# Patient Record
Sex: FEMALE | Race: OTHER | HISPANIC OR LATINO
[De-identification: names, ages, dates, MRNs, and addresses within clinical notes are randomized per-mention and may not be internally consistent; named-entity substitution may affect disease eponyms.]

---

## 2020-10-01 ENCOUNTER — OFFICE VISIT (OUTPATIENT)
Age: 60
End: 2020-10-01

## 2021-08-01 ENCOUNTER — OFFICE VISIT (OUTPATIENT)
Age: 61
End: 2021-08-01

## 2021-09-10 ENCOUNTER — OFFICE VISIT (OUTPATIENT)
Dept: URBAN - METROPOLITAN AREA CLINIC 7 | Facility: CLINIC | Age: 61
End: 2021-09-10

## 2021-09-17 ENCOUNTER — OFFICE VISIT (OUTPATIENT)
Dept: URBAN - METROPOLITAN AREA SURGERY CENTER 5 | Facility: SURGERY CENTER | Age: 61
End: 2021-09-17

## 2022-07-07 ENCOUNTER — WEB ENCOUNTER (OUTPATIENT)
Dept: URBAN - METROPOLITAN AREA CLINIC 63 | Facility: CLINIC | Age: 62
End: 2022-07-07

## 2022-07-09 ENCOUNTER — TELEPHONE ENCOUNTER (OUTPATIENT)
Dept: URBAN - METROPOLITAN AREA CLINIC 121 | Facility: CLINIC | Age: 62
End: 2022-07-09

## 2022-07-10 ENCOUNTER — TELEPHONE ENCOUNTER (OUTPATIENT)
Dept: URBAN - METROPOLITAN AREA CLINIC 121 | Facility: CLINIC | Age: 62
End: 2022-07-10

## 2022-07-11 ENCOUNTER — OFFICE VISIT (OUTPATIENT)
Dept: URBAN - METROPOLITAN AREA CLINIC 63 | Facility: CLINIC | Age: 62
End: 2022-07-11
Payer: COMMERCIAL

## 2022-07-11 VITALS
WEIGHT: 146.2 LBS | OXYGEN SATURATION: 96 % | TEMPERATURE: 97.9 F | HEIGHT: 59 IN | SYSTOLIC BLOOD PRESSURE: 146 MMHG | DIASTOLIC BLOOD PRESSURE: 96 MMHG | HEART RATE: 91 BPM | BODY MASS INDEX: 29.48 KG/M2

## 2022-07-11 DIAGNOSIS — K29.60 REFLUX GASTRITIS: ICD-10-CM

## 2022-07-11 DIAGNOSIS — K21.00 GASTROESOPHAGEAL REFLUX DISEASE WITH ESOPHAGITIS WITHOUT HEMORRHAGE: ICD-10-CM

## 2022-07-11 DIAGNOSIS — K29.30 CHRONIC SUPERFICIAL GASTRITIS WITHOUT BLEEDING: ICD-10-CM

## 2022-07-11 PROBLEM — 266433003: Status: ACTIVE | Noted: 2022-07-11

## 2022-07-11 PROBLEM — 196735001: Status: ACTIVE | Noted: 2022-07-11

## 2022-07-11 PROCEDURE — 99204 OFFICE O/P NEW MOD 45 MIN: CPT | Performed by: INTERNAL MEDICINE

## 2022-07-11 RX ORDER — RABEPRAZOLE SODIUM 20 MG/1
1 TABLET TABLET, DELAYED RELEASE ORAL ONCE A DAY
Status: ACTIVE | COMMUNITY

## 2022-07-11 RX ORDER — PRAVASTATIN SODIUM 10 MG/1
1 TABLET TABLET ORAL ONCE A DAY
Status: ACTIVE | COMMUNITY

## 2022-07-11 RX ORDER — CILOSTAZOL 50 MG/1
1 TABLET 30 MINUTES BEFORE OR 2 HOURS AFTER BREAKFAST AND DINNER TABLET ORAL TWICE A DAY
Status: ACTIVE | COMMUNITY

## 2022-07-11 RX ORDER — LEVOTHYROXINE SODIUM 50 UG/1
1 TABLET IN THE MORNING ON AN EMPTY STOMACH TABLET ORAL ONCE A DAY
Status: ACTIVE | COMMUNITY

## 2022-07-11 RX ORDER — FAMOTIDINE 20 MG/1
1 TABLET AT BEDTIME AS NEEDED TABLET, FILM COATED ORAL ONCE A DAY
Status: ACTIVE | COMMUNITY

## 2022-07-11 RX ORDER — TELMISARTAN AND HYDROCHLOROTHIAZIDE 12.5; 8 MG/1; MG/1
1 TABLET TABLET ORAL ONCE A DAY
Status: ACTIVE | COMMUNITY

## 2022-07-11 NOTE — HPI-TODAY'S VISIT:
Patient had an upper endoscopy and colonoscopy in September 2021 with impression of a small hiatal hernia, distal esophageal biopsies were taken, normal appearance of esophagus.  Single fundic gland gastric polyp, biopsies were taken, gastritis, large duodenal diverticulum in the second portion of the duodenum, biopsies were taken of the small bowel gastric, gastric polyp, and distal esophagus, at this moment I do have the pathology will need to follow-up this with PCP.  Patient also had a colonoscopy that day that only show a small hemorrhoids she will have a repeat colonoscopy in 10 years, denies family history of colorectal cancer or alarm symptoms.  Patient had abdominal pain in the right upper quadrant and ultrasound was performed and has per patient patient have a cyst that seems to have been increased in size, will need records of ultrasound and the pathology of the previous test test before to adjust treatment to precise plan will follow protocol. Marya is taking PPI, but she stop treatment symptoms recur.  Will need records, will follow in 4 weeks.

## 2022-07-30 ENCOUNTER — TELEPHONE ENCOUNTER (OUTPATIENT)
Age: 62
End: 2022-07-30

## 2022-07-31 ENCOUNTER — TELEPHONE ENCOUNTER (OUTPATIENT)
Age: 62
End: 2022-07-31

## 2022-07-31 RX ORDER — RABEPRAZOLE SODIUM 20 MG/1
1 TABLET, DELAYED RELEASE ORAL
Qty: 0 | Refills: 16 | Status: ACTIVE | COMMUNITY
Start: 2021-09-17

## 2022-08-13 ENCOUNTER — WEB ENCOUNTER (OUTPATIENT)
Dept: URBAN - METROPOLITAN AREA CLINIC 63 | Facility: CLINIC | Age: 62
End: 2022-08-13

## 2022-08-17 ENCOUNTER — OFFICE VISIT (OUTPATIENT)
Dept: URBAN - METROPOLITAN AREA CLINIC 63 | Facility: CLINIC | Age: 62
End: 2022-08-17
Payer: COMMERCIAL

## 2022-08-17 ENCOUNTER — WEB ENCOUNTER (OUTPATIENT)
Dept: URBAN - METROPOLITAN AREA CLINIC 63 | Facility: CLINIC | Age: 62
End: 2022-08-17

## 2022-08-17 VITALS
WEIGHT: 145 LBS | HEIGHT: 59 IN | DIASTOLIC BLOOD PRESSURE: 74 MMHG | RESPIRATION RATE: 20 BRPM | TEMPERATURE: 98 F | SYSTOLIC BLOOD PRESSURE: 126 MMHG | HEART RATE: 69 BPM | OXYGEN SATURATION: 98 % | BODY MASS INDEX: 29.23 KG/M2

## 2022-08-17 DIAGNOSIS — R10.13 DYSPEPSIA: ICD-10-CM

## 2022-08-17 DIAGNOSIS — K21.9 GERD (GASTROESOPHAGEAL REFLUX DISEASE): ICD-10-CM

## 2022-08-17 PROCEDURE — 99213 OFFICE O/P EST LOW 20 MIN: CPT | Performed by: INTERNAL MEDICINE

## 2022-08-17 RX ORDER — CILOSTAZOL 50 MG/1
1 TABLET 30 MINUTES BEFORE OR 2 HOURS AFTER BREAKFAST AND DINNER TABLET ORAL TWICE A DAY
Status: ACTIVE | COMMUNITY

## 2022-08-17 RX ORDER — LEVOTHYROXINE SODIUM 50 UG/1
1 TABLET IN THE MORNING ON AN EMPTY STOMACH TABLET ORAL ONCE A DAY
Status: ACTIVE | COMMUNITY

## 2022-08-17 RX ORDER — FAMOTIDINE 20 MG/1
1 TABLET AT BEDTIME AS NEEDED TABLET, FILM COATED ORAL ONCE A DAY
Status: ACTIVE | COMMUNITY

## 2022-08-17 RX ORDER — RABEPRAZOLE SODIUM 20 MG/1
1 TABLET TABLET, DELAYED RELEASE ORAL ONCE A DAY
Status: ACTIVE | COMMUNITY

## 2022-08-17 RX ORDER — PREGABALIN 50 MG/1
1 CAPSULE CAPSULE ORAL ONCE A DAY
Status: ACTIVE | COMMUNITY

## 2022-08-17 RX ORDER — CYCLOBENZAPRINE HYDROCHLORIDE 10 MG/1
1 TABLET AT BEDTIME AS NEEDED TABLET, FILM COATED ORAL ONCE A DAY
Status: ACTIVE | COMMUNITY

## 2022-08-17 RX ORDER — PRAVASTATIN SODIUM 10 MG/1
1 TABLET TABLET ORAL ONCE A DAY
Status: ACTIVE | COMMUNITY

## 2022-08-17 RX ORDER — TELMISARTAN AND HYDROCHLOROTHIAZIDE 12.5; 8 MG/1; MG/1
1 TABLET TABLET ORAL ONCE A DAY
Status: ACTIVE | COMMUNITY

## 2022-08-17 NOTE — HPI-TODAY'S VISIT:
Patient had an upper endoscopy and colonoscopy in September 2021 with impression of a small hiatal hernia, distal esophageal biopsies were taken, normal appearance of esophagus.  Single fundic gland gastric polyp, biopsies were taken, gastritis, large duodenal diverticulum in the second portion of the duodenum, biopsies were taken of the small bowel gastric, gastric polyp, and distal esophagus, at this moment I do have the pathology will need to follow-up this with PCP.  Patient also had a colonoscopy that day that only show a small hemorrhoids she will have a repeat colonoscopy in 10 years, denies family history of colorectal cancer or alarm symptoms.  Patient had abdominal pain in the right upper quadrant and ultrasound was performed and has per patient patient have a cyst that seems to have been increased in size, will need records of ultrasound and the pathology of the previous test test before to adjust treatment to precise plan will follow protocol. Patistan is taking PPI, but she stop treatment symptoms recur.  Will need records, will follow in 4 weeks. 8/22: Patient feels better, will need to get records, from her last  visit doing diet and feels better but on treatment. Will ask again to get records to adjust treatment. Will do diet and will do PPi in am, famotidine at night and carafate during the day for month and willl  do adjustment and will try to wean her off  the mediaction if is tolerated.

## 2022-08-24 ENCOUNTER — OFFICE VISIT (OUTPATIENT)
Dept: URBAN - METROPOLITAN AREA CLINIC 60 | Facility: CLINIC | Age: 62
End: 2022-08-24

## 2022-11-07 ENCOUNTER — TELEPHONE ENCOUNTER (OUTPATIENT)
Dept: URBAN - METROPOLITAN AREA CLINIC 63 | Facility: CLINIC | Age: 62
End: 2022-11-07

## 2022-11-09 ENCOUNTER — LAB OUTSIDE AN ENCOUNTER (OUTPATIENT)
Dept: URBAN - METROPOLITAN AREA CLINIC 63 | Facility: CLINIC | Age: 62
End: 2022-11-09

## 2022-11-09 ENCOUNTER — OFFICE VISIT (OUTPATIENT)
Dept: URBAN - METROPOLITAN AREA CLINIC 63 | Facility: CLINIC | Age: 62
End: 2022-11-09
Payer: COMMERCIAL

## 2022-11-09 VITALS
HEART RATE: 83 BPM | WEIGHT: 149.4 LBS | TEMPERATURE: 98 F | BODY MASS INDEX: 30.12 KG/M2 | SYSTOLIC BLOOD PRESSURE: 130 MMHG | DIASTOLIC BLOOD PRESSURE: 70 MMHG | OXYGEN SATURATION: 92 % | HEIGHT: 59 IN

## 2022-11-09 DIAGNOSIS — K57.90 DIVERTICULOSIS: ICD-10-CM

## 2022-11-09 DIAGNOSIS — K21.9 GERD (GASTROESOPHAGEAL REFLUX DISEASE): ICD-10-CM

## 2022-11-09 DIAGNOSIS — R10.13 DYSPEPSIA: ICD-10-CM

## 2022-11-09 DIAGNOSIS — K59.09 CHRONIC CONSTIPATION: ICD-10-CM

## 2022-11-09 DIAGNOSIS — K76.89 LIVER CYST: ICD-10-CM

## 2022-11-09 PROBLEM — 85057007: Status: ACTIVE | Noted: 2022-11-09

## 2022-11-09 PROCEDURE — 99213 OFFICE O/P EST LOW 20 MIN: CPT | Performed by: INTERNAL MEDICINE

## 2022-11-09 RX ORDER — RABEPRAZOLE SODIUM 20 MG/1
1 TABLET TABLET, DELAYED RELEASE ORAL ONCE A DAY
Qty: 30 | OUTPATIENT
Start: 2022-11-09

## 2022-11-09 RX ORDER — TELMISARTAN AND HYDROCHLOROTHIAZIDE 12.5; 8 MG/1; MG/1
1 TABLET TABLET ORAL ONCE A DAY
Status: ACTIVE | COMMUNITY

## 2022-11-09 RX ORDER — RABEPRAZOLE SODIUM 20 MG/1
1 TABLET TABLET, DELAYED RELEASE ORAL ONCE A DAY
Status: ACTIVE | COMMUNITY

## 2022-11-09 RX ORDER — FAMOTIDINE 20 MG/1
1 TABLET AT BEDTIME AS NEEDED TABLET, FILM COATED ORAL ONCE A DAY
Status: ACTIVE | COMMUNITY

## 2022-11-09 RX ORDER — PRAVASTATIN SODIUM 10 MG/1
1 TABLET TABLET ORAL ONCE A DAY
Status: ACTIVE | COMMUNITY

## 2022-11-09 RX ORDER — PREGABALIN 50 MG/1
1 CAPSULE CAPSULE ORAL ONCE A DAY
Status: ACTIVE | COMMUNITY

## 2022-11-09 RX ORDER — CILOSTAZOL 50 MG/1
1 TABLET 30 MINUTES BEFORE OR 2 HOURS AFTER BREAKFAST AND DINNER TABLET ORAL TWICE A DAY
Status: ACTIVE | COMMUNITY

## 2022-11-09 RX ORDER — LEVOTHYROXINE SODIUM 50 UG/1
1 TABLET IN THE MORNING ON AN EMPTY STOMACH TABLET ORAL ONCE A DAY
Status: ACTIVE | COMMUNITY

## 2022-11-09 NOTE — HPI-TODAY'S VISIT:
Patient had an upper endoscopy and colonoscopy in September 2021 with impression of a small hiatal hernia, distal esophageal biopsies were taken, normal appearance of esophagus.  Single fundic gland gastric polyp, biopsies were taken, gastritis, large duodenal diverticulum in the second portion of the duodenum, biopsies were taken of the small bowel gastric, gastric polyp, and distal esophagus, at this moment I do not have the pathology will need to follow-up this with PCP.  Patient also had a colonoscopy that day that only show a small hemorrhoids she will have a repeat colonoscopy in 10 years, denies family history of colorectal cancer or alarm symptoms.  Patient had abdominal pain in the right upper quadrant and ultrasound was performed and has per patient patient have a cyst that seems to have been increased in size, will need records of ultrasound and the pathology of the previous test before to adjust treatment to precise plan will follow protocol. Marya is taking PPI, but she stop treatment symptoms recur.  Will need records, will follow in 4 weeks. 8/22: Patient feels better, will need to get records, from her last  visit doing diet and feels better but on treatment. Will ask again to get records to adjust treatment. Will do diet and will do PPi in am, famotidine at night and carafate during the day for month and willl  do adjustment and will try to wean her off  the mediaction if is tolerated. 11/22: Pateint comes for chronic pain in the RUQ last ulrasound was done in January of 2022 with evidence of a 2.2 cm cyst left hepatic lobe. previuosly 1.7 cm. will repeat ultrasound. Also had a 0.9 cm renal cyst right kidney. Will plan Ultrasound of the abdomen and will follow in 6 weeks. Pathology was received form preious endoscopic evaluation with normal colon  with internal hemorrhoids and diverticulosis and mild gastritis with fundic gastric polyps, duodenum is normal ( does had a diverticulum)

## 2023-01-17 ENCOUNTER — OFFICE VISIT (OUTPATIENT)
Dept: URBAN - METROPOLITAN AREA CLINIC 63 | Facility: CLINIC | Age: 63
End: 2023-01-17
Payer: COMMERCIAL

## 2023-01-17 ENCOUNTER — LAB OUTSIDE AN ENCOUNTER (OUTPATIENT)
Dept: URBAN - METROPOLITAN AREA CLINIC 63 | Facility: CLINIC | Age: 63
End: 2023-01-17

## 2023-01-17 VITALS
HEART RATE: 81 BPM | WEIGHT: 148 LBS | HEIGHT: 59 IN | TEMPERATURE: 98 F | DIASTOLIC BLOOD PRESSURE: 80 MMHG | OXYGEN SATURATION: 96 % | SYSTOLIC BLOOD PRESSURE: 125 MMHG | BODY MASS INDEX: 29.84 KG/M2

## 2023-01-17 DIAGNOSIS — K21.9 GERD (GASTROESOPHAGEAL REFLUX DISEASE): ICD-10-CM

## 2023-01-17 DIAGNOSIS — K76.0 FATTY LIVER: ICD-10-CM

## 2023-01-17 PROBLEM — 197321007: Status: ACTIVE | Noted: 2023-01-17

## 2023-01-17 PROCEDURE — 99213 OFFICE O/P EST LOW 20 MIN: CPT | Performed by: INTERNAL MEDICINE

## 2023-01-17 RX ORDER — PREGABALIN 50 MG/1
1 CAPSULE CAPSULE ORAL ONCE A DAY
Status: ACTIVE | COMMUNITY

## 2023-01-17 RX ORDER — RABEPRAZOLE SODIUM 20 MG/1
1 TABLET TABLET, DELAYED RELEASE ORAL ONCE A DAY
Qty: 30 | Status: ACTIVE | COMMUNITY
Start: 2022-11-09

## 2023-01-17 RX ORDER — FAMOTIDINE 20 MG/1
1 TABLET AT BEDTIME AS NEEDED TABLET, FILM COATED ORAL ONCE A DAY
Status: ACTIVE | COMMUNITY

## 2023-01-17 RX ORDER — LEVOTHYROXINE SODIUM 50 UG/1
1 TABLET IN THE MORNING ON AN EMPTY STOMACH TABLET ORAL ONCE A DAY
Status: ACTIVE | COMMUNITY

## 2023-01-17 RX ORDER — TELMISARTAN AND HYDROCHLOROTHIAZIDE 12.5; 8 MG/1; MG/1
1 TABLET TABLET ORAL ONCE A DAY
Status: ACTIVE | COMMUNITY

## 2023-01-17 RX ORDER — MELOXICAM 15 MG/1
1 TABLET TABLET ORAL ONCE A DAY
Status: ACTIVE | COMMUNITY

## 2023-01-17 RX ORDER — CILOSTAZOL 50 MG/1
1 TABLET 30 MINUTES BEFORE OR 2 HOURS AFTER BREAKFAST AND DINNER TABLET ORAL TWICE A DAY
Status: ACTIVE | COMMUNITY

## 2023-01-17 RX ORDER — RABEPRAZOLE SODIUM 20 MG/1
1 TABLET TABLET, DELAYED RELEASE ORAL ONCE A DAY
Qty: 90 | Refills: 1

## 2023-01-17 RX ORDER — RABEPRAZOLE SODIUM 20 MG/1
1 TABLET TABLET, DELAYED RELEASE ORAL ONCE A DAY
Status: ACTIVE | COMMUNITY

## 2023-01-17 RX ORDER — PRAVASTATIN SODIUM 10 MG/1
1 TABLET TABLET ORAL ONCE A DAY
Status: ACTIVE | COMMUNITY

## 2023-01-17 NOTE — HPI-TODAY'S VISIT:
Patient had an upper endoscopy and colonoscopy in September 2021 with impression of a small hiatal hernia, distal esophageal biopsies were taken, normal appearance of esophagus.  Single fundic gland gastric polyp, biopsies were taken, gastritis, large duodenal diverticulum in the second portion of the duodenum, biopsies were taken of the small bowel gastric, gastric polyp, and distal esophagus, at this moment I do not have the pathology will need to follow-up this with PCP.  Patient also had a colonoscopy that day that only show a small hemorrhoids she will have a repeat colonoscopy in 10 years, denies family history of colorectal cancer or alarm symptoms.  Patient had abdominal pain in the right upper quadrant and ultrasound was performed and has per patient patient have a cyst that seems to have been increased in size, will need records of ultrasound and the pathology of the previous test before to adjust treatment to precise plan will follow protocol. Marya is taking PPI, but she stop treatment symptoms recur.  Will need records, will follow in 4 weeks. 8/22: Patient feels better, will need to get records, from her last  visit doing diet and feels better but on treatment. Will ask again to get records to adjust treatment. Will do diet and will do PPi in am, famotidine at night and carafate during the day for month and willl  do adjustment and will try to wean her off  the mediaction if is tolerated. 11/22: Pateint comes for chronic pain in the RUQ last ulrasound was done in January of 2022 with evidence of a 2.2 cm cyst left hepatic lobe. previuosly 1.7 cm. will repeat ultrasound. Also had a 0.9 cm renal cyst right kidney. Will plan Ultrasound of the abdomen and will follow in 6 weeks. Pathology was received form preious endoscopic evaluation with normal colon  with internal hemorrhoids and diverticulosis and mild gastritis with fundic gastric polyps, duodenum is normal ( does had a diverticulum) Patient had an upper endoscopy and colonoscopy in September 2021 with impression of a small hiatal hernia, distal esophageal biopsies were taken, normal appearance of esophagus.  Single fundic gland gastric polyp, biopsies were taken, gastritis, large duodenal diverticulum in the second portion of the duodenum, biopsies were taken of the small bowel gastric, gastric polyp, and distal esophagus, at this moment I do have the pathology will need to follow-up this with PCP.  Patient also had a colonoscopy that day that only show a small hemorrhoids she will have a repeat colonoscopy in 10 years, denies family history of colorectal cancer or alarm symptoms.  Patient had abdominal pain in the right upper quadrant and ultrasound was performed and has per patient patient have a cyst that seems to have been increased in size, will need records of ultrasound and the pathology of the previous test test before to adjust treatment to precise plan will follow protocol. Patiet is taking PPI, but she stop treatment symptoms recur.  Will need records, will follow in 4 weeks. 1/23: Patient had ultrasound with evidence of mild pelvocaleceal fullness on the right kidney, no stone visualized, right kidney cyst and stable liver cyst. Fatty infiltration, will plan fibroscan.  Will plan ultrasound in one year.

## 2023-05-16 ENCOUNTER — OFFICE VISIT (OUTPATIENT)
Dept: URBAN - METROPOLITAN AREA CLINIC 63 | Facility: CLINIC | Age: 63
End: 2023-05-16
Payer: COMMERCIAL

## 2023-05-16 VITALS
WEIGHT: 142 LBS | DIASTOLIC BLOOD PRESSURE: 86 MMHG | SYSTOLIC BLOOD PRESSURE: 130 MMHG | HEART RATE: 82 BPM | OXYGEN SATURATION: 97 % | BODY MASS INDEX: 28.63 KG/M2 | HEIGHT: 59 IN | TEMPERATURE: 97.3 F

## 2023-05-16 DIAGNOSIS — K76.0 FATTY LIVER: ICD-10-CM

## 2023-05-16 DIAGNOSIS — R10.31 RIGHT LOWER QUADRANT ABDOMINAL PAIN: ICD-10-CM

## 2023-05-16 DIAGNOSIS — K76.89 LIVER CYST: ICD-10-CM

## 2023-05-16 PROBLEM — 301754002: Status: ACTIVE | Noted: 2023-05-16

## 2023-05-16 PROCEDURE — 99214 OFFICE O/P EST MOD 30 MIN: CPT | Performed by: INTERNAL MEDICINE

## 2023-05-16 RX ORDER — PRAVASTATIN SODIUM 10 MG/1
1 TABLET TABLET ORAL ONCE A DAY
Status: ACTIVE | COMMUNITY

## 2023-05-16 RX ORDER — RABEPRAZOLE SODIUM 20 MG/1
1 TABLET TABLET, DELAYED RELEASE ORAL ONCE A DAY
Qty: 30 | Status: ACTIVE | COMMUNITY
Start: 2022-11-09

## 2023-05-16 RX ORDER — TELMISARTAN AND HYDROCHLOROTHIAZIDE 12.5; 8 MG/1; MG/1
1 TABLET TABLET ORAL ONCE A DAY
Status: ACTIVE | COMMUNITY

## 2023-05-16 RX ORDER — RABEPRAZOLE SODIUM 20 MG/1
1 TABLET TABLET, DELAYED RELEASE ORAL ONCE A DAY
Qty: 90 | Refills: 1 | Status: ACTIVE | COMMUNITY

## 2023-05-16 RX ORDER — PREGABALIN 50 MG/1
1 CAPSULE CAPSULE ORAL ONCE A DAY
Status: ACTIVE | COMMUNITY

## 2023-05-16 RX ORDER — FAMOTIDINE 20 MG/1
1 TABLET AT BEDTIME AS NEEDED TABLET, FILM COATED ORAL ONCE A DAY
Status: ACTIVE | COMMUNITY

## 2023-05-16 RX ORDER — MELOXICAM 15 MG/1
1 TABLET TABLET ORAL ONCE A DAY
Status: ACTIVE | COMMUNITY

## 2023-05-16 RX ORDER — LEVOTHYROXINE SODIUM 50 UG/1
1 TABLET IN THE MORNING ON AN EMPTY STOMACH TABLET ORAL ONCE A DAY
Status: ACTIVE | COMMUNITY

## 2023-05-16 RX ORDER — CILOSTAZOL 50 MG/1
1 TABLET 30 MINUTES BEFORE OR 2 HOURS AFTER BREAKFAST AND DINNER TABLET ORAL TWICE A DAY
Status: ACTIVE | COMMUNITY

## 2023-05-16 NOTE — HPI-TODAY'S VISIT:
Patient had an upper endoscopy and colonoscopy in September 2021 with impression of a small hiatal hernia, distal esophageal biopsies were taken, normal appearance of esophagus.  Single fundic gland gastric polyp, biopsies were taken, gastritis, large duodenal diverticulum in the second portion of the duodenum, biopsies were taken of the small bowel gastric, gastric polyp, and distal esophagus, at this moment I do not have the pathology will need to follow-up this with PCP.  Patient also had a colonoscopy that day that only show a small hemorrhoids she will have a repeat colonoscopy in 10 years, denies family history of colorectal cancer or alarm symptoms.  Patient had abdominal pain in the right upper quadrant and ultrasound was performed and has per patient patient have a cyst that seems to have been increased in size, will need records of ultrasound and the pathology of the previous test before to adjust treatment to precise plan will follow protocol. Marya is taking PPI, but she stop treatment symptoms recur.  Will need records, will follow in 4 weeks. 8/22: Patient feels better, will need to get records, from her last  visit doing diet and feels better but on treatment. Will ask again to get records to adjust treatment. Will do diet and will do PPi in am, famotidine at night and carafate during the day for month and willl  do adjustment and will try to wean her off  the mediaction if is tolerated. 11/22: Pateint comes for chronic pain in the RUQ last ulrasound was done in January of 2022 with evidence of a 2.2 cm cyst left hepatic lobe. previuosly 1.7 cm. will repeat ultrasound. Also had a 0.9 cm renal cyst right kidney. Will plan Ultrasound of the abdomen and will follow in 6 weeks. Pathology was received form preious endoscopic evaluation with normal colon  with internal hemorrhoids and diverticulosis and mild gastritis with fundic gastric polyps, duodenum is normal ( does had a diverticulum) Patient had an upper endoscopy and colonoscopy in September 2021 with impression of a small hiatal hernia, distal esophageal biopsies were taken, normal appearance of esophagus.  Single fundic gland gastric polyp, biopsies were taken, gastritis, large duodenal diverticulum in the second portion of the duodenum, biopsies were taken of the small bowel gastric, gastric polyp, and distal esophagus, at this moment I do have the pathology will need to follow-up this with PCP.  Patient also had a colonoscopy that day that only show a small hemorrhoids she will have a repeat colonoscopy in 10 years, denies family history of colorectal cancer or alarm symptoms.  Patient had abdominal pain in the right upper quadrant and ultrasound was performed and has per patient patient have a cyst that seems to have been increased in size, will need records of ultrasound and the pathology of the previous test test before to adjust treatment to precise plan will follow protocol. Patiet is taking PPI, but she stop treatment symptoms recur.  Will need records, will follow in 4 weeks. 1/23: Patient had ultrasound with evidence of mild pelvocaleceal fullness on the right kidney, no stone visualized, right kidney cyst and stable liver cyst. Fatty infiltration, will plan fibroscan.  Will plan ultrasound in one year. 5/23: Patient had CT scan of abdomen and pelvis with and without contrast for abdominal pain, with the findings of herniorraphy surgery is identified in the lower anterior abdominal wall.  There is a progressive thickening of the anterior margin aspect of the rectus abdominis muscle and deep to the mesh.  This is indeterminant in etiology.  This could reflect hematoma, an infected collection or possibly even a lesion.  There is a mild enhancement following contrast administration.  Clinical correlation recommended.  Sampling may be required.  Otherwise unremarkable CT scan of abdomen and pelvis.  With this finding patient will need to be seen by surgery, will need to be evaluated and if is indicated to take samples of the area.  We will follow the patient in 3-month. The findings may also describe multiple liver cysts but otherwise unremarkable liver, gallbladder, pancreas, spleen and adrenal glands.  There was also a small benign cyst in the right kidney.  And evidence of diverticulosis without diverticulitis. Patient will see again the surgeon, and will plan the surgery. Will follow in 6 months, will defer management to surgery.  Patient had fibroscan with mild steatosis S1 and no fibrosis  F0, will repeat test in one year.

## 2023-05-16 NOTE — PHYSICAL EXAM GASTROINTESTINAL
Abdomen , soft, RLQ tender to palpation, nondistended , no guarding or rigidity , no masses palpable , normal bowel sounds , Liver and Spleen,  no hepatosplenomegaly , liver nontender,

## 2023-05-16 NOTE — PHYSICAL EXAM CHEST:
----- Message from Anirudh Spann DO sent at 12/28/2017 12:55 PM CST -----  I'm calling to follow up with you on your immaging results  Dr Spann asked me to notify you that :  X-rays are normal         chest wall non-tender, breathing is unlabored without accessory muscle use, normal breath sounds

## 2023-11-07 ENCOUNTER — LAB OUTSIDE AN ENCOUNTER (OUTPATIENT)
Dept: URBAN - METROPOLITAN AREA CLINIC 63 | Facility: CLINIC | Age: 63
End: 2023-11-07

## 2023-11-07 ENCOUNTER — OFFICE VISIT (OUTPATIENT)
Dept: URBAN - METROPOLITAN AREA CLINIC 63 | Facility: CLINIC | Age: 63
End: 2023-11-07
Payer: COMMERCIAL

## 2023-11-07 VITALS
HEART RATE: 58 BPM | SYSTOLIC BLOOD PRESSURE: 126 MMHG | WEIGHT: 147 LBS | HEIGHT: 59 IN | DIASTOLIC BLOOD PRESSURE: 84 MMHG | OXYGEN SATURATION: 95 % | BODY MASS INDEX: 29.64 KG/M2 | TEMPERATURE: 97.5 F

## 2023-11-07 DIAGNOSIS — K57.90 DIVERTICULOSIS: ICD-10-CM

## 2023-11-07 DIAGNOSIS — K76.89 LIVER CYST: ICD-10-CM

## 2023-11-07 DIAGNOSIS — R10.13 DYSPEPSIA: ICD-10-CM

## 2023-11-07 DIAGNOSIS — K76.0 FATTY LIVER: ICD-10-CM

## 2023-11-07 PROCEDURE — 99214 OFFICE O/P EST MOD 30 MIN: CPT | Performed by: INTERNAL MEDICINE

## 2023-11-07 RX ORDER — RABEPRAZOLE SODIUM 20 MG/1
1 TABLET TABLET, DELAYED RELEASE ORAL ONCE A DAY
Qty: 90 TABLET | Refills: 1 | OUTPATIENT
Start: 2022-11-09

## 2023-11-07 RX ORDER — LEVOTHYROXINE SODIUM 50 UG/1
1 TABLET IN THE MORNING ON AN EMPTY STOMACH TABLET ORAL ONCE A DAY
Status: ACTIVE | COMMUNITY

## 2023-11-07 RX ORDER — CILOSTAZOL 50 MG/1
1 TABLET 30 MINUTES BEFORE OR 2 HOURS AFTER BREAKFAST AND DINNER TABLET ORAL TWICE A DAY
Status: ACTIVE | COMMUNITY

## 2023-11-07 RX ORDER — LOSARTAN POTASSIUM 50 MG/1
1 TABLET TABLET ORAL ONCE A DAY
Status: ACTIVE | COMMUNITY

## 2023-11-07 RX ORDER — PREGABALIN 50 MG/1
1 CAPSULE CAPSULE ORAL ONCE A DAY
Status: ACTIVE | COMMUNITY

## 2023-11-07 RX ORDER — RABEPRAZOLE SODIUM 20 MG/1
1 TABLET TABLET, DELAYED RELEASE ORAL ONCE A DAY
Qty: 30 | Status: ACTIVE | COMMUNITY
Start: 2022-11-09

## 2023-11-07 NOTE — PHYSICAL EXAM GASTROINTESTINAL
Abdomen, soft, nontender, nondistended, no guarding or rigidity, no masses palpable, normal bowel sounds, Liver and Spleen,  no hepatosplenomegaly, liver nontender, scar healing of recent surgery with removal of mesh.

## 2023-11-07 NOTE — HPI-TODAY'S VISIT:
Patient had an upper endoscopy and colonoscopy in September 2021 with impression of a small hiatal hernia, distal esophageal biopsies were taken, normal appearance of esophagus.  Single fundic gland gastric polyp, biopsies were taken, gastritis, large duodenal diverticulum in the second portion of the duodenum, biopsies were taken of the small bowel gastric, gastric polyp, and distal esophagus, at this moment I do not have the pathology will need to follow-up this with PCP.  Patient also had a colonoscopy that day that only show a small hemorrhoids she will have a repeat colonoscopy in 10 years, denies family history of colorectal cancer or alarm symptoms.  Patient had abdominal pain in the right upper quadrant and ultrasound was performed and has per patient patient have a cyst that seems to have been increased in size, will need records of ultrasound and the pathology of the previous test before to adjust treatment to precise plan will follow protocol. Marya is taking PPI, but she stop treatment symptoms recur.  Will need records, will follow in 4 weeks. 8/22: Patient feels better, will need to get records, from her last  visit doing diet and feels better but on treatment. Will ask again to get records to adjust treatment. Will do diet and will do PPi in am, famotidine at night and carafate during the day for month and willl  do adjustment and will try to wean her off  the mediaction if is tolerated. 11/22: Pateint comes for chronic pain in the RUQ last ulrasound was done in January of 2022 with evidence of a 2.2 cm cyst left hepatic lobe. previuosly 1.7 cm. will repeat ultrasound. Also had a 0.9 cm renal cyst right kidney. Will plan Ultrasound of the abdomen and will follow in 6 weeks. Pathology was received form preious endoscopic evaluation with normal colon  with internal hemorrhoids and diverticulosis and mild gastritis with fundic gastric polyps, duodenum is normal ( does had a diverticulum) Patient had an upper endoscopy and colonoscopy in September 2021 with impression of a small hiatal hernia, distal esophageal biopsies were taken, normal appearance of esophagus.  Single fundic gland gastric polyp, biopsies were taken, gastritis, large duodenal diverticulum in the second portion of the duodenum, biopsies were taken of the small bowel gastric, gastric polyp, and distal esophagus, at this moment I do have the pathology will need to follow-up this with PCP.  Patient also had a colonoscopy that day that only show a small hemorrhoids she will have a repeat colonoscopy in 10 years, denies family history of colorectal cancer or alarm symptoms.  Patient had abdominal pain in the right upper quadrant and ultrasound was performed and has per patient patient have a cyst that seems to have been increased in size, will need records of ultrasound and the pathology of the previous test test before to adjust treatment to precise plan will follow protocol. Patiet is taking PPI, but she stop treatment symptoms recur.  Will need records, will follow in 4 weeks. 1/23: Patient had ultrasound with evidence of mild pelvocaleceal fullness on the right kidney, no stone visualized, right kidney cyst and stable liver cyst. Fatty infiltration, will plan fibroscan.  Will plan ultrasound in one year. 5/23: Patient had CT scan of abdomen and pelvis with and without contrast for abdominal pain, with the findings of herniorraphy surgery is identified in the lower anterior abdominal wall.  There is a progressive thickening of the anterior margin aspect of the rectus abdominis muscle and deep to the mesh.  This is indeterminant in etiology.  This could reflect hematoma, an infected collection or possibly even a lesion.  There is a mild enhancement following contrast administration.  Clinical correlation recommended.  Sampling may be required.  Otherwise unremarkable CT scan of abdomen and pelvis.  With this finding patient will need to be seen by surgery, will need to be evaluated and if is indicated to take samples of the area.  We will follow the patient in 3-month. The findings may also describe multiple liver cysts but otherwise unremarkable liver, gallbladder, pancreas, spleen and adrenal glands.  There was also a small benign cyst in the right kidney.  And evidence of diverticulosis without diverticulitis. Patient will see again the surgeon, and will plan the surgery. Will follow in 6 months, will defer management to surgery.  Patient had fibroscan with mild steatosis S1 and no fibrosis  F0, will repeat test in one year. 11/23: Patient had surgery with removal of the mesh, complicated with infection that needed 3 interventions to draine and clean the area, patient now feels well, denies abdominal pain and denies any GI complains. Will follow as needed basis. Will do fibroscan and will ask to PCP labs at the end of this year. Patient with personal h/o fatty liver no fibrosis on last study.

## 2023-12-01 ENCOUNTER — TELEPHONE ENCOUNTER (OUTPATIENT)
Dept: URBAN - METROPOLITAN AREA CLINIC 64 | Facility: CLINIC | Age: 63
End: 2023-12-01

## 2024-03-05 ENCOUNTER — OV EP (OUTPATIENT)
Dept: URBAN - METROPOLITAN AREA CLINIC 63 | Facility: CLINIC | Age: 64
End: 2024-03-05

## 2024-03-07 ENCOUNTER — OV EP (OUTPATIENT)
Dept: URBAN - METROPOLITAN AREA SURGERY CENTER 4 | Facility: SURGERY CENTER | Age: 64
End: 2024-03-07

## 2024-03-20 ENCOUNTER — OV EP (OUTPATIENT)
Dept: URBAN - METROPOLITAN AREA CLINIC 63 | Facility: CLINIC | Age: 64
End: 2024-03-20
Payer: COMMERCIAL

## 2024-03-20 VITALS
OXYGEN SATURATION: 97 % | SYSTOLIC BLOOD PRESSURE: 127 MMHG | HEIGHT: 59 IN | DIASTOLIC BLOOD PRESSURE: 86 MMHG | BODY MASS INDEX: 29.84 KG/M2 | HEART RATE: 74 BPM | WEIGHT: 148 LBS | TEMPERATURE: 98.1 F

## 2024-03-20 DIAGNOSIS — K57.90 DIVERTICULOSIS: ICD-10-CM

## 2024-03-20 DIAGNOSIS — K59.09 CHRONIC CONSTIPATION: ICD-10-CM

## 2024-03-20 DIAGNOSIS — K82.4 GALLBLADDER POLYP: ICD-10-CM

## 2024-03-20 DIAGNOSIS — K76.0 FATTY LIVER: ICD-10-CM

## 2024-03-20 DIAGNOSIS — K76.89 LIVER CYST: ICD-10-CM

## 2024-03-20 DIAGNOSIS — K21.9 GERD (GASTROESOPHAGEAL REFLUX DISEASE): ICD-10-CM

## 2024-03-20 PROCEDURE — 99214 OFFICE O/P EST MOD 30 MIN: CPT | Performed by: INTERNAL MEDICINE

## 2024-03-20 RX ORDER — RABEPRAZOLE SODIUM 20 MG/1
1 TABLET TABLET, DELAYED RELEASE ORAL ONCE A DAY
Qty: 90 TABLET | Refills: 1 | Status: ACTIVE | COMMUNITY
Start: 2022-11-09

## 2024-03-20 RX ORDER — PREGABALIN 50 MG/1
1 CAPSULE CAPSULE ORAL ONCE A DAY
Status: ACTIVE | COMMUNITY

## 2024-03-20 RX ORDER — LOSARTAN POTASSIUM 50 MG/1
1 TABLET TABLET ORAL ONCE A DAY
Status: ACTIVE | COMMUNITY

## 2024-03-20 RX ORDER — CILOSTAZOL 50 MG/1
1 TABLET 30 MINUTES BEFORE OR 2 HOURS AFTER BREAKFAST AND DINNER TABLET ORAL TWICE A DAY
Status: ACTIVE | COMMUNITY

## 2024-03-20 RX ORDER — LEVOTHYROXINE SODIUM 50 UG/1
1 TABLET IN THE MORNING ON AN EMPTY STOMACH TABLET ORAL ONCE A DAY
Status: ACTIVE | COMMUNITY

## 2024-03-20 NOTE — HPI-TODAY'S VISIT:
Patient had an upper endoscopy and colonoscopy in September 2021 with impression of a small hiatal hernia, distal esophageal biopsies were taken, normal appearance of esophagus.  Single fundic gland gastric polyp, biopsies were taken, gastritis, large duodenal diverticulum in the second portion of the duodenum, biopsies were taken of the small bowel gastric, gastric polyp, and distal esophagus, at this moment I do not have the pathology will need to follow-up this with PCP.  Patient also had a colonoscopy that day that only show a small hemorrhoids she will have a repeat colonoscopy in 10 years, denies family history of colorectal cancer or alarm symptoms.  Patient had abdominal pain in the right upper quadrant and ultrasound was performed and has per patient patient have a cyst that seems to have been increased in size, will need records of ultrasound and the pathology of the previous test before to adjust treatment to precise plan will follow protocol. Marya is taking PPI, but she stop treatment symptoms recur.  Will need records, will follow in 4 weeks. 8/22: Patient feels better, will need to get records, from her last  visit doing diet and feels better but on treatment. Will ask again to get records to adjust treatment. Will do diet and will do PPi in am, famotidine at night and carafate during the day for month and willl  do adjustment and will try to wean her off  the mediaction if is tolerated. 11/22: Pateint comes for chronic pain in the RUQ last ulrasound was done in January of 2022 with evidence of a 2.2 cm cyst left hepatic lobe. previuosly 1.7 cm. will repeat ultrasound. Also had a 0.9 cm renal cyst right kidney. Will plan Ultrasound of the abdomen and will follow in 6 weeks. Pathology was received form preious endoscopic evaluation with normal colon  with internal hemorrhoids and diverticulosis and mild gastritis with fundic gastric polyps, duodenum is normal ( does had a diverticulum) Patient had an upper endoscopy and colonoscopy in September 2021 with impression of a small hiatal hernia, distal esophageal biopsies were taken, normal appearance of esophagus.  Single fundic gland gastric polyp, biopsies were taken, gastritis, large duodenal diverticulum in the second portion of the duodenum, biopsies were taken of the small bowel gastric, gastric polyp, and distal esophagus, at this moment I do have the pathology will need to follow-up this with PCP.  Patient also had a colonoscopy that day that only show a small hemorrhoids she will have a repeat colonoscopy in 10 years, denies family history of colorectal cancer or alarm symptoms.  Patient had abdominal pain in the right upper quadrant and ultrasound was performed and has per patient patient have a cyst that seems to have been increased in size, will need records of ultrasound and the pathology of the previous test test before to adjust treatment to precise plan will follow protocol. Patiet is taking PPI, but she stop treatment symptoms recur.  Will need records, will follow in 4 weeks. 1/23: Patient had ultrasound with evidence of mild pelvocaleceal fullness on the right kidney, no stone visualized, right kidney cyst and stable liver cyst. Fatty infiltration, will plan fibroscan.  Will plan ultrasound in one year. 5/23: Patient had CT scan of abdomen and pelvis with and without contrast for abdominal pain, with the findings of herniorraphy surgery is identified in the lower anterior abdominal wall.  There is a progressive thickening of the anterior margin aspect of the rectus abdominis muscle and deep to the mesh.  This is indeterminant in etiology.  This could reflect hematoma, an infected collection or possibly even a lesion.  There is a mild enhancement following contrast administration.  Clinical correlation recommended.  Sampling may be required.  Otherwise unremarkable CT scan of abdomen and pelvis.  With this finding patient will need to be seen by surgery, will need to be evaluated and if is indicated to take samples of the area.  We will follow the patient in 3-month. The findings may also describe multiple liver cysts but otherwise unremarkable liver, gallbladder, pancreas, spleen and adrenal glands.  There was also a small benign cyst in the right kidney.  And evidence of diverticulosis without diverticulitis. Patient will see again the surgeon, and will plan the surgery. Will follow in 6 months, will defer management to surgery.  Patient had fibroscan with mild steatosis S1 and no fibrosis  F0, will repeat test in one year. 11/23: Patient had surgery with removal of the mesh, complicated with infection that needed 3 interventions to draine and clean the area, patient now feels well, denies abdominal pain and denies any GI complains. Will follow as needed basis. Will do fibroscan and will ask to PCP labs at the end of this year. Patient with personal h/o fatty liver no fibrosis on last study. 3/24: Patient being seen at the office had an ultrasound of the abdomen with the impression of benign hepatic and right renal cysts.  No acute abnormality.  Gallbladder polyp measuring 4 mm.  With this finding we will need to have a repeat ultrasound in a year.  At the beginning of this year had a FibroScan with no evidence of steatosis and no evidence of fibrosis of the liver.  With this finding on the left.  Found abnormal LFTs we will continue monitoring with ultrasound of the abdomen and elevated the gallbladder polyp.  Patient was elevated cholesterol 224, hemoglobin A1c of 5.8.  LFTs are normal, normal bilirubin, normal TSH CBC is normal.  Will forward labs in 1 year, patient will need to do diet and treatment for high cholesterol. Patient with with occasional internal hemorrhods irritation will do trial with topical treatment.

## 2024-10-02 ENCOUNTER — LAB OUTSIDE AN ENCOUNTER (OUTPATIENT)
Age: 64
End: 2024-10-02

## 2024-11-25 ENCOUNTER — TELEPHONE ENCOUNTER (OUTPATIENT)
Dept: URBAN - METROPOLITAN AREA CLINIC 64 | Facility: CLINIC | Age: 64
End: 2024-11-25

## 2024-11-27 ENCOUNTER — OFFICE VISIT (OUTPATIENT)
Dept: URBAN - METROPOLITAN AREA CLINIC 63 | Facility: CLINIC | Age: 64
End: 2024-11-27

## 2025-01-07 ENCOUNTER — OFFICE VISIT (OUTPATIENT)
Dept: URBAN - METROPOLITAN AREA CLINIC 63 | Facility: CLINIC | Age: 65
End: 2025-01-07
Payer: COMMERCIAL

## 2025-01-07 ENCOUNTER — DASHBOARD ENCOUNTERS (OUTPATIENT)
Age: 65
End: 2025-01-07

## 2025-01-07 VITALS
SYSTOLIC BLOOD PRESSURE: 130 MMHG | HEIGHT: 59 IN | WEIGHT: 144 LBS | BODY MASS INDEX: 29.03 KG/M2 | HEART RATE: 70 BPM | TEMPERATURE: 98 F | OXYGEN SATURATION: 97 % | DIASTOLIC BLOOD PRESSURE: 79 MMHG

## 2025-01-07 DIAGNOSIS — K57.90 DIVERTICULOSIS: ICD-10-CM

## 2025-01-07 DIAGNOSIS — K21.9 GERD (GASTROESOPHAGEAL REFLUX DISEASE): ICD-10-CM

## 2025-01-07 DIAGNOSIS — K64.8 INTERNAL HEMORRHOIDS: ICD-10-CM

## 2025-01-07 PROCEDURE — 99214 OFFICE O/P EST MOD 30 MIN: CPT

## 2025-01-07 RX ORDER — LOSARTAN POTASSIUM 50 MG/1
1 TABLET TABLET ORAL ONCE A DAY
Status: ACTIVE | COMMUNITY

## 2025-01-07 RX ORDER — LEVOTHYROXINE SODIUM 50 UG/1
1 TABLET IN THE MORNING ON AN EMPTY STOMACH TABLET ORAL ONCE A DAY
Status: ACTIVE | COMMUNITY

## 2025-01-07 RX ORDER — CILOSTAZOL 50 MG/1
1 TABLET 30 MINUTES BEFORE OR 2 HOURS AFTER BREAKFAST AND DINNER TABLET ORAL TWICE A DAY
Status: ACTIVE | COMMUNITY

## 2025-01-07 NOTE — HPI-TODAY'S VISIT:
3/24: Patient being seen at the office had an ultrasound of the abdomen with the impression of benign hepatic and right renal cysts.  No acute abnormality.  Gallbladder polyp measuring 4 mm.  With this finding we will need to have a repeat ultrasound in a year.  At the beginning of this year had a FibroScan with no evidence of steatosis and no evidence of fibrosis of the liver.  With this finding on the left.  Found abnormal LFTs we will continue monitoring with ultrasound of the abdomen and elevated the gallbladder polyp.  Patient was elevated cholesterol 224, hemoglobin A1c of 5.8.  LFTs are normal, normal bilirubin, normal TSH CBC is normal.  Will forward labs in 1 year, patient will need to do diet and treatment for high cholesterol. Patient with occasional internal hemorrhoids' irritation will do trial with topical treatment.  1/25 This is a 54 years old female patient seen in the office visit today complaining about internal hemorrhoid irritation, patient reported topical treatment does not work, she is having itching, pain and mucus discharge. Patient denies dysphagia, dyspepsia, pyrosis, abdominal pain, change in bowel habits, unintentional weight loss, melena, or hematochezia. Patient referred to colorectal surgery provider.